# Patient Record
Sex: MALE | Race: OTHER | HISPANIC OR LATINO | URBAN - METROPOLITAN AREA
[De-identification: names, ages, dates, MRNs, and addresses within clinical notes are randomized per-mention and may not be internally consistent; named-entity substitution may affect disease eponyms.]

---

## 2019-10-04 ENCOUNTER — EMERGENCY (EMERGENCY)
Facility: HOSPITAL | Age: 34
LOS: 1 days | Discharge: HOME | End: 2019-10-04
Attending: EMERGENCY MEDICINE
Payer: SELF-PAY

## 2019-10-04 VITALS
HEART RATE: 62 BPM | HEIGHT: 62.2 IN | RESPIRATION RATE: 18 BRPM | SYSTOLIC BLOOD PRESSURE: 108 MMHG | TEMPERATURE: 98 F | WEIGHT: 160.06 LBS | DIASTOLIC BLOOD PRESSURE: 68 MMHG | OXYGEN SATURATION: 98 %

## 2019-10-04 PROCEDURE — 99284 EMERGENCY DEPT VISIT MOD MDM: CPT

## 2019-10-04 PROCEDURE — 99053 MED SERV 10PM-8AM 24 HR FAC: CPT

## 2019-10-04 PROCEDURE — T1013: CPT

## 2019-10-04 PROCEDURE — 99283 EMERGENCY DEPT VISIT LOW MDM: CPT

## 2019-10-04 RX ORDER — LIDOCAINE 4 G/100G
1 CREAM TOPICAL
Qty: 6 | Refills: 0
Start: 2019-10-04

## 2019-10-04 RX ORDER — LIDOCAINE 4 G/100G
1 CREAM TOPICAL ONCE
Refills: 0 | Status: COMPLETED | OUTPATIENT
Start: 2019-10-04 | End: 2019-10-04

## 2019-10-04 RX ORDER — IBUPROFEN 200 MG
600 TABLET ORAL ONCE
Refills: 0 | Status: COMPLETED | OUTPATIENT
Start: 2019-10-04 | End: 2019-10-04

## 2019-10-04 RX ORDER — IBUPROFEN 200 MG
1 TABLET ORAL
Qty: 20 | Refills: 0
Start: 2019-10-04

## 2019-10-04 RX ORDER — METHOCARBAMOL 500 MG/1
1500 TABLET, FILM COATED ORAL ONCE
Refills: 0 | Status: COMPLETED | OUTPATIENT
Start: 2019-10-04 | End: 2019-10-04

## 2019-10-04 RX ORDER — DIAZEPAM 5 MG
5 TABLET ORAL ONCE
Refills: 0 | Status: DISCONTINUED | OUTPATIENT
Start: 2019-10-04 | End: 2019-10-04

## 2019-10-04 RX ORDER — METHOCARBAMOL 500 MG/1
2 TABLET, FILM COATED ORAL
Qty: 30 | Refills: 0
Start: 2019-10-04

## 2019-10-04 RX ADMIN — LIDOCAINE 1 PATCH: 4 CREAM TOPICAL at 02:51

## 2019-10-04 RX ADMIN — Medication 600 MILLIGRAM(S): at 02:51

## 2019-10-04 RX ADMIN — Medication 5 MILLIGRAM(S): at 04:11

## 2019-10-04 RX ADMIN — METHOCARBAMOL 1500 MILLIGRAM(S): 500 TABLET, FILM COATED ORAL at 02:51

## 2019-10-04 NOTE — ED PROVIDER NOTE - PHYSICAL EXAMINATION
Gen: Well appearing in NAD  Head: NC/AT  Neck: trachea midline  Resp:  No distress  Ext: no deformities, no midline spinal ttp. + L lateral gluteal ttp. + straight leg raise on L.   Neuro:  A&O appears non focal  Skin:  Warm and dry as visualized  Psych:  Normal affect and mood

## 2019-10-04 NOTE — ED ADULT TRIAGE NOTE - CHIEF COMPLAINT QUOTE
" I have pain and numbness to my left butt down my left leg, it started about 4 days ago" Denies injury.

## 2019-10-04 NOTE — ED PROVIDER NOTE - PROGRESS NOTE DETAILS
Pt reports no improvement despite treatment.  Pt able to ambulate to the bathroom.  Will give Valium and reassess. Conner NEWMAN: Pt reports no improvement despite treatment.  Pt able to ambulate to the bathroom.  Will give Valium and reassess. Pt sleeping, in no acute distress.  Able to ambulate.  Will discharge home. Pt sleeping, in no acute distress.  Able to ambulate.  Will discharge home .

## 2019-10-04 NOTE — ED PROVIDER NOTE - OBJECTIVE STATEMENT
Translation provided by ED : Genia  34M no PMH p/w L sided back pain radiating down leg a/w numbness when pain gets bad x 4 days. Bent down to pick something up and since then has had on and off back pains. Taking tylenol at home for pain. Has not seen anyone for the pain. No fevers, chills, recent spinal procedures, bowel/bladder incontinence, IVDU, cancer, > 52 yo, recent weight loss, trauma, weakness, tingling, dysuria, hematuria.

## 2019-10-04 NOTE — ED PROVIDER NOTE - ATTENDING CONTRIBUTION TO CARE
Lauren: I performed a face to face bedside interview with patient regarding history of present illness, review of symptoms and past medical history. I completed an independent physical exam and ordered tests/medications as needed.  I have discussed patient's plan of care with the resident. The resident assisted in  executing the discussed plan. I was available for any questions or issues that may have arose during the execution of the plan of care.

## 2021-10-08 NOTE — ED PROVIDER NOTE - PATIENT PORTAL LINK FT
No You can access the FollowMyHealth Patient Portal offered by [facility] by registering at the following website: [email or web address]. By joining [facility]’s FollowMyHealth portal, you will also be able to view your health information using other applications (apps) compatible with our system.